# Patient Record
Sex: FEMALE | Race: WHITE | URBAN - METROPOLITAN AREA
[De-identification: names, ages, dates, MRNs, and addresses within clinical notes are randomized per-mention and may not be internally consistent; named-entity substitution may affect disease eponyms.]

---

## 2017-01-27 ENCOUNTER — OFFICE VISIT (OUTPATIENT)
Dept: FAMILY MEDICINE | Facility: CLINIC | Age: 22
End: 2017-01-27
Payer: COMMERCIAL

## 2017-01-27 VITALS
SYSTOLIC BLOOD PRESSURE: 114 MMHG | HEIGHT: 65 IN | DIASTOLIC BLOOD PRESSURE: 82 MMHG | WEIGHT: 144.19 LBS | TEMPERATURE: 97.7 F | HEART RATE: 94 BPM | OXYGEN SATURATION: 99 % | BODY MASS INDEX: 24.02 KG/M2

## 2017-01-27 DIAGNOSIS — K58.0 IRRITABLE BOWEL SYNDROME WITH DIARRHEA: Primary | ICD-10-CM

## 2017-01-27 DIAGNOSIS — N92.6 IRREGULAR PERIODS: ICD-10-CM

## 2017-01-27 DIAGNOSIS — L01.00 IMPETIGO: ICD-10-CM

## 2017-01-27 PROCEDURE — 99214 OFFICE O/P EST MOD 30 MIN: CPT | Performed by: FAMILY MEDICINE

## 2017-01-27 RX ORDER — MUPIROCIN 20 MG/G
OINTMENT TOPICAL 3 TIMES DAILY
Qty: 22 G | Refills: 1 | Status: SHIPPED | OUTPATIENT
Start: 2017-01-27 | End: 2017-02-01

## 2017-01-27 RX ORDER — LACTOBACILLUS ACIDOPHILUS 20B CELL
CAPSULE ORAL
Qty: 60 CAPSULE | Refills: 3 | Status: SHIPPED | OUTPATIENT
Start: 2017-01-27

## 2017-01-27 NOTE — PROGRESS NOTES
SUBJECTIVE:                                                    Dayana Whitman is a 22 year old female who presents to clinic today for the following health issues:      Gastrointestinal symptoms      Duration:  Onset in November followed up by a GI Provider    Description:           Bloating and sever gas in abdominal region    Intensity:  severe    Accompanying signs and symptoms:  Diarrhea, cramping and gas    History  Previous {similar problem: YES  Previous evaluation:  Blood tests and GI consultation    Aggravating factors: Gluten and dairy    Alleviating factors: lifestyle change including clean foods    Other Therapies tried:  as mentioned   All.LILLI Hopper  Provider note:    Here for follow-up. Still having gas bloating and painful loose stools. She does feel that symptoms are better but not completely gone.  She has seen [facility] and does have a follow-up visit but given that she still having symptoms wanted to see if there is anything else we can try until that visit comes up.    tried gluten and dairy free foods this helps some but still has some symptoms.  Saw Gi late November  They advised same   Labs for thyroid were checked  Has another Fu this feburary  Was told about IBS given info on low FODMAP diet  Tried this and no real change  Bloating and gas noted all the time  Has diarrhea - possibly once per week - is painful when she gets this  Eating very clean vegetables, etc even eating extra ball seems to make her symptoms worse.  Taking has tried probiotics culture L but has not tried lactobacillus acidophilus  Has tried imodium for 1 month - noted slower stools and felt much  more constipated did not like this so she stopped it. Did not try intermittent use  Has not tried fiber    Has lost about 10 pounds but thinks this is because of a restricted diet her energy is great and feels well otherwise. No blood in her stools.    #2 also has a concern for skin dryness. Her right ring finger has a dry  irritated patch that's got some crusting. She now has a little bit on her left ring finger as well. She denies any new lotions or soaps and denies any new habits repetitive use of her hands.    #3 also has noted one episode of early menstrual flow. Just in the last month she had some vaginal bleeding preceding her period by one week. She has had this in the past on one occasion and it was due to birth control. She has not missed any pills and has been on a moderate dose monophasic pill for the last few years. This has worked well for her. She does not have any pelvic pain she has no new sexual partners    Problem list and histories reviewed & adjusted, as indicated.  Additional history: as documented    Patient Active Problem List   Diagnosis     Superficial injury of cornea( resolving) OD     Isolated or specific phobia     Ankle fracture     Diagnostic skin and sensitization tests(aka ALLERGENS)     Allergy to mold spores     Allergic rhinitis due to animal dander     Seasonal allergic conjunctivitis     Seasonal allergic rhinitis     Acne     Need for desensitization to allergens     History reviewed. No pertinent past surgical history.    Social History   Substance Use Topics     Smoking status: Never Smoker      Smokeless tobacco: Never Used     Alcohol Use: No     Family History   Problem Relation Age of Onset     Lipids Mother      Neurologic Disorder Mother      Psychotic Disorder Mother      Arthritis Maternal Grandfather      Prostate Cancer Maternal Grandfather      Breast Cancer Paternal Grandmother      CANCER Paternal Grandmother      HEART DISEASE Paternal Grandfather      Hypertension Maternal Grandmother      Lipids Maternal Grandmother          Problem list, Medication list, Allergies, and Medical/Social/Surgical histories reviewed in EPIC and updated as appropriate.    ROS:  Constitutional, HEENT, cardiovascular, pulmonary, gi and gu systems are negative, except as otherwise noted.    OBJECTIVE:    "                                                 /82 mmHg  Pulse 94  Temp(Src) 97.7  F (36.5  C) (Tympanic)  Ht 5' 5.25\" (1.657 m)  Wt 144 lb 3 oz (65.403 kg)  BMI 23.82 kg/m2  SpO2 99%  LMP 01/20/2017  Breastfeeding? No  Body mass index is 23.82 kg/(m^2).  GENERAL APPEARANCE: healthy, alert and no distress  HENT: Moist mucous membranes  ABDOMEN: soft, nontender, without hepatosplenomegaly or masses and bowel sounds normal  SKIN: Small area of dry skin along both fingers of her right left hand. There is little bit of honey crusting indicating likely bacterial secondary infection.       ASSESSMENT/PLAN:                                                    1. Irritable bowel syndrome with diarrhea  It does seem that she has irritable bowel. Will try fiber with lots of water and slowly increase to see if this helps from stools. Discussed trying specifically lactobacillus acidophilus and reviewed where to get this if it's not available at her pharmacy she does have follow-up in February with GI and I think this is great  Certainly if her symptoms are persistent then colonoscopy and workup for inflammatory bowel is the next step    - psyllium 0.52 G capsule; Take 1 capsule (0.52 g) by mouth daily After 1-2 weeks increase to 2 per day with water  Dispense: 90 capsule; Refill: 1  - Lactobacillus (FLORAJEN ACIDOPHILUS) CAPS; 1 capsule twice daily  Dispense: 60 capsule; Refill: 3    2. Impetigo  Treating as noted below discussed ways to avoid dry skin  - mupirocin (BACTROBAN) 2 % ointment; Apply topically 3 times daily for 5 days  Dispense: 22 g; Refill: 1    3. Irregular periods  Did not focus on this today due to other concerns that she had.    I reviewed with her that if she has ongoing irregular periods over the next 2 weeks would recommend pelvic ultrasound and pelvic exam.  She's had one episode of early onset of menses and this may have been a cyst or something that had resolved. Would recommend rechecking " or following her symptoms over the next 2 months. If she has pain or ongoing bleeding needs to be evaluated  - US Pelvic Complete with Transvaginal; Future      Follow up with Provider - as noted above with GI and certainly if she has any pelvic pain or worsening irregular periods should be seen for pelvic exam and ultrasound     Carol Wilson MD  [facility]    HPI      ROS      Physical Exam

## 2017-01-27 NOTE — MR AVS SNAPSHOT
After Visit Summary   1/27/2017    Dayana Whitman    MRN: [id number]           Patient Information     Date Of Birth          1995        Visit Information        Provider Department      1/27/2017 3:00 PM Carol Wilson MD [facility]        Today's Diagnoses     Irritable bowel syndrome with diarrhea    -  1     Impetigo         Irregular periods            Follow-ups after your visit        Future tests that were ordered for you today     Open Future Orders        Priority Expected Expires Ordered    US Pelvic Complete with Transvaginal Routine  1/27/2018 1/27/2017            Who to contact     If you have questions or need follow up information about today's clinic visit or your schedule please contact [facility] directly at [phone].  Normal or non-critical lab and imaging results will be communicated to you by Leondra musichart, letter or phone within 4 business days after the clinic has received the results. If you do not hear from us within 7 days, please contact the clinic through MyChart or phone. If you have a critical or abnormal lab result, we will notify you by phone as soon as possible.  Submit refill requests through Venda or call your pharmacy and they will forward the refill request to us. Please allow 3 business days for your refill to be completed.          Additional Information About Your Visit        MyChart Information     Venda gives you secure access to your electronic health record. If you see a primary care provider, you can also send messages to your care team and make appointments. If you have questions, please call your primary care clinic.  If you do not have a primary care provider, please call [phone] and they will assist you.        Care EveryWhere ID     This is your Care EveryWhere ID. This could be used by other organizations to access your [place] medical records  [id number]        Your Vitals Were     Pulse Temperature  "Height    94 97.7  F (36.5  C) (Tympanic) 5' 5.25\" (1.657 m)    BMI (Body Mass Index) Pulse Oximetry Last Period    23.82 kg/m2 99% 01/20/2017    Breastfeeding?          No         Blood Pressure from Last 3 Encounters:   01/27/17 114/82   12/02/16 108/62   11/11/16 110/67    Weight from Last 3 Encounters:   01/27/17 144 lb 3 oz (65.403 kg) (73.90 %*)   12/02/16 143 lb 9.6 oz (65.137 kg) (73.50 %*)   11/11/16 143 lb 11.2 oz (65.182 kg) (73.75 %*)     * Growth percentiles are based on [facility] 2-20 Years data.                 Today's Medication Changes          These changes are accurate as of: 1/27/17  3:33 PM.  If you have any questions, ask your nurse or doctor.               Start taking these medicines.        Dose/Directions    FLORAJEN ACIDOPHILUS Caps   Used for:  Irritable bowel syndrome with diarrhea   Started by:  Carol Wilson MD        1 capsule twice daily   Quantity:  60 capsule   Refills:  3       mupirocin 2 % ointment   Commonly known as:  BACTROBAN   Used for:  Impetigo   Started by:  Carol Wilson MD        Apply topically 3 times daily for 5 days   Quantity:  22 g   Refills:  1       psyllium 0.52 G capsule   Used for:  Irritable bowel syndrome with diarrhea   Started by:  Carol Wilson MD        Dose:  1 capsule   Take 1 capsule (0.52 g) by mouth daily After 1-2 weeks increase to 2 per day with water   Quantity:  90 capsule   Refills:  1            Where to get your medicines      These medications were sent to [id number] - [place], [place] - [address]  [address], [facility] [address]     Phone:  [phone]    - FLORAJEN ACIDOPHILUS Caps  - mupirocin 2 % ointment  - psyllium 0.52 G capsule             Primary Care Provider Office Phone # Fax #    Myrna Pineda [phone]       [facility] [address] [address] [address]        Thank you!     Thank you for choosing [facility]  for your care. Our goal is always to provide you with excellent care. Hearing back from our patients is one way we can continue to improve our services. Please take a few minutes to complete the written survey that you may receive in the mail after your visit with us. Thank you!             Your Updated Medication List - Protect others around you: Learn how to safely use, store and throw away your medicines at www.disposemymeds.org.          This list is accurate as of: 1/27/17  3:33 PM.  Always use your most recent med list.                   Brand Name Dispense Instructions for use    adapalene 0.1 % gel    DIFFERIN    45 g    Apply topically At Bedtime       ALLERGEN IMMUNOTHERAPY PRESCRIPTION          CULTURELLE DIGESTIVE HEALTH Caps     60 capsule    Try 1 capsule twice daily for 2-4 weeks       drospirenone-ethinyl estradiol 3-0.02 MG per tablet    KEVIN    90 tablet    Take 1 tablet by mouth daily       FLORAJEN ACIDOPHILUS Caps     60 capsule    1 capsule twice daily       mupirocin 2 % ointment    BACTROBAN    22 g    Apply topically 3 times daily for 5 days       psyllium 0.52 G capsule     90 capsule    Take 1 capsule (0.52 g) by mouth daily After 1-2 weeks increase to 2 per day with water

## 2017-01-27 NOTE — NURSING NOTE
"Chief Complaint   Patient presents with     Gastrointestinal Problem     Diarrhea, cramping and gas     /82 mmHg  Pulse 94  Temp(Src) 97.7  F (36.5  C) (Tympanic)  Ht 5' 5.25\" (1.657 m)  Wt 144 lb 3 oz (65.403 kg)  BMI 23.82 kg/m2  SpO2 99%  LMP 01/20/2017  Breastfeeding? No Estimated body mass index is 23.82 kg/(m^2) as calculated from the following:    Height as of this encounter: 5' 5.25\" (1.657 m).    Weight as of this encounter: 144 lb 3 oz (65.403 kg).  bp completed using cuff size: regular      Health Maintenance addressed:  NONE    n/a                "

## 2017-04-10 DIAGNOSIS — L70.0 ACNE VULGARIS: ICD-10-CM

## 2017-04-10 NOTE — TELEPHONE ENCOUNTER
Received a refill request from patient's pharmacy for Raquel.  Patient was last seen in June 2016.  At that time, patient was recommended to follow up in 10 weeks.  No follow up has been completed and no upcoming appointment scheduled.  Order pended to Dr. Farfan to review.

## 2017-04-11 RX ORDER — DROSPIRENONE AND ETHINYL ESTRADIOL 0.02-3(28)
1 KIT ORAL DAILY
Qty: 90 TABLET | Refills: 0 | Status: SHIPPED | OUTPATIENT
Start: 2017-04-11 | End: 2017-06-13

## 2017-06-13 ENCOUNTER — OFFICE VISIT (OUTPATIENT)
Dept: DERMATOLOGY | Facility: CLINIC | Age: 22
End: 2017-06-13
Attending: DERMATOLOGY
Payer: COMMERCIAL

## 2017-06-13 DIAGNOSIS — L30.9 DERMATITIS: Primary | ICD-10-CM

## 2017-06-13 DIAGNOSIS — L70.0 ACNE VULGARIS: ICD-10-CM

## 2017-06-13 PROCEDURE — 99212 OFFICE O/P EST SF 10 MIN: CPT | Mod: ZF

## 2017-06-13 RX ORDER — ADAPALENE 45 G/G
GEL TOPICAL AT BEDTIME
Qty: 45 G | Refills: 3 | Status: SHIPPED | OUTPATIENT
Start: 2017-06-13

## 2017-06-13 RX ORDER — DESONIDE 0.5 MG/G
OINTMENT TOPICAL
Qty: 15 G | Refills: 0 | Status: SHIPPED | OUTPATIENT
Start: 2017-06-13 | End: 2018-03-07

## 2017-06-13 RX ORDER — DROSPIRENONE AND ETHINYL ESTRADIOL 0.02-3(28)
1 KIT ORAL DAILY
Qty: 90 TABLET | Refills: 3 | Status: SHIPPED | OUTPATIENT
Start: 2017-06-13 | End: 2018-03-07

## 2017-06-13 NOTE — MR AVS SNAPSHOT
After Visit Summary   6/13/2017    Dayana Whitman    MRN: [id number]           Patient Information     Date Of Birth          1995        Visit Information        Provider Department      6/13/2017 3:00 PM Adriana Farfan MD Peds Dermatology        Today's Diagnoses     Dermatitis    -  1    Acne vulgaris          Care Instructions    [facility]- Pediatric Dermatology  Dr. Adriana Farfan, Dr. Brianna Byers, Dr. Sandra Avila, Dr. Rosa Danielson, Dr. Albert Crouch       Pediatric Appointment Scheduling and Call Center [phone]     Non Urgent -Triage Voicemail Line; [phone]- Sobeida and Cecilia RN's. Messages are checked periodically throughout the day and are returned as soon as possible.      Clinic Fax number: [phone]    If you need a prescription refill, please contact your pharmacy. They will send us an electronic request. Refills are approved or denied by our Physicians during normal business hours, Monday through Fridays    Per office policy, refills will not be granted if you have not been seen within the past year (or sooner depending on your child's condition)    *Radiology Scheduling- [phone]  *Sedation Unit Scheduling- [phone]  *[place] Scheduling- General [phone]; Pediatric Dermatology [phone]  *Main  Services: [phone]   Upper sorbian: [phone]   Sri Lankan: [phone]   Hmong/German/Persian: [phone]    For urgent matters that cannot wait until the next business day, is over a holiday and/or a weekend please call [phone] and ask for the Dermatology Resident On-Call to be paged.               Continue Raquel, continue follow up with OBGYN for PAP smears    You may need to purchase diferin over the counter    Continue benzoyl peroxide wash    Start desonide ointment for the rash, if not gone in 2 weeks we will send a safer medication to use around the eyes          Follow-ups after your  visit        Follow-up notes from your care team     Return in about 1 year (around 6/13/2018).      Who to contact     Please call your clinic at [phone] to:    Ask questions about your health    Make or cancel appointments    Discuss your medicines    Learn about your test results    Speak to your doctor   If you have compliments or concerns about an experience at your clinic, or if you wish to file a complaint, please contact [facility] Patient Relations at [phone] or email us at [email or web address]         Additional Information About Your Visit        Nextnavhart Information     Suniva gives you secure access to your electronic health record. If you see a primary care provider, you can also send messages to your care team and make appointments. If you have questions, please call your primary care clinic.  If you do not have a primary care provider, please call [phone] and they will assist you.      Suniva is an electronic gateway that provides easy, online access to your medical records. With Suniva, you can request a clinic appointment, read your test results, renew a prescription or communicate with your care team.     To access your existing account, please contact your [facility] or call [phone] for assistance.        Care EveryWhere ID     This is your Care EveryWhere ID. This could be used by other organizations to access your [place] medical records  [id number]         Blood Pressure from Last 3 Encounters:   01/27/17 114/82   12/02/16 108/62   11/11/16 110/67    Weight from Last 3 Encounters:   01/27/17 144 lb 3 oz (65.4 kg) (74 %)*   12/02/16 143 lb 9.6 oz (65.1 kg) (74 %)*   11/11/16 143 lb 11.2 oz (65.2 kg) (74 %)*     * Growth percentiles are based on CDC 2-20 Years data.              Today, you had the following     No orders found for display         Today's Medication Changes          These changes are  accurate as of: 6/13/17  3:55 PM.  If you have any questions, ask your nurse or doctor.               Start taking these medicines.        Dose/Directions    desonide 0.05 % ointment   Commonly known as:  DESOWEN   Used for:  Dermatitis   Started by:  Adriana Farfan MD        Use on the neck and eyelid for rash twice a day until clear, call if you need to use more than 2 weeks   Quantity:  15 g   Refills:  0            Where to get your medicines      These medications were sent to [facility] [address]  [address], [facility] [address]     Phone:  [phone]     adapalene 0.1 % gel    desonide 0.05 % ointment    drospirenone-ethinyl estradiol 3-0.02 MG per tablet                Primary Care Provider Office Phone # Fax #    Myrna Pineda [phone]       [address] [address]        Thank you!     Thank you for choosing [facility]  for your care. Our goal is always to provide you with excellent care. Hearing back from our patients is one way we can continue to improve our services. Please take a few minutes to complete the written survey that you may receive in the mail after your visit with us. Thank you!             Your Updated Medication List - Protect others around you: Learn how to safely use, store and throw away your medicines at www.disposemymeds.org.          This list is accurate as of: 6/13/17  3:55 PM.  Always use your most recent med list.                   Brand Name Dispense Instructions for use    adapalene 0.1 % gel    DIFFERIN    45 g    Apply topically At Bedtime       ALLERGEN IMMUNOTHERAPY PRESCRIPTION          [facility] DIGESTIVE HEALTH Caps     60 capsule    Try 1 capsule twice daily for 2-4 weeks       desonide 0.05 % ointment    DESOWEN    15 g    Use on the neck and eyelid for rash twice a day until clear, call if you need to use more than 2 weeks        drospirenone-ethinyl estradiol 3-0.02 MG per tablet    KEVIN    90 tablet    Take 1 tablet by mouth daily       FLORAJEN ACIDOPHILUS Caps     60 capsule    1 capsule twice daily       psyllium 0.52 G capsule     90 capsule    Take 1 capsule (0.52 g) by mouth daily After 1-2 weeks increase to 2 per day with water

## 2017-06-13 NOTE — PROGRESS NOTES
[facility]   Pediatric Dermatology Follow up Patient Visit        CHIEF COMPLAINT: f/u acne    DERMATOLOGY PROBLEM LIST:   1. Acne  - previous: ortho tri-cyclen  Currently  - KEVIN  - adapalene  - BPO    HISTORY OF PRESENT ILLNESS: This is a 22 year old female who presents by herself.  Last visit 6/7/16.  At that time her OCP was switched from ortho tri-cyclen to KEVIN for her acne, occasionally using adapalene 0.1% gel and BPO wash. She states that she is very pleased KEVIN, will have flares with period, which is when she uses the BPO/adapalene. She states that her acne today is the best it has ever been.     She also presents with a rash on her left neck x 3 months. This is slightly pruritic, no treatments.     PAST MEDICAL HISTORY:   Past Medical History:   Diagnosis Date     Allergic rhinitis due to animal dander      Allergy to mold spores     10/12 skin tests per Dr. Fernando pos. for: cat/dog/M/G/RW     Diagnostic skin and sensitization tests 10/12 skin tests per Dr. Fernando pos. for: cat/dog/M/G/RW     Need for desensitization to allergens 10/14 IT start for cat/dog/M/G/RW     Seasonal allergic conjunctivitis      Seasonal allergic rhinitis         FAMILY HISTORY:   Family History   Problem Relation Age of Onset     Lipids Mother      Neurologic Disorder Mother      Psychotic Disorder Mother      Arthritis Maternal Grandfather      Prostate Cancer Maternal Grandfather      Breast Cancer Paternal Grandmother      CANCER Paternal Grandmother      HEART DISEASE Paternal Grandfather      Hypertension Maternal Grandmother      Lipids Maternal Grandmother        SOCIAL HISTORY: student at the , starting summer classes. Recently engaged to be .    REVIEW OF SYSTEMS: A 10-point review of systems was noncontributory.  Dayana Whitman  denies fevers, chills, weight loss, fatigue, chest pain, shortness of breath, abdominal symptoms, nausea, vomiting, diarrhea, constipation,  genitourinary, or musculoskeletal complaints.     MEDICATIONS:   Current Outpatient Prescriptions   Medication     drospirenone-ethinyl estradiol (KEVIN) 3-0.02 MG per tablet     psyllium 0.52 G capsule     Lactobacillus (FLORAJEN ACIDOPHILUS) CAPS     Lactobacillus-Inulin ([facility] DIGESTIVE HEALTH) CAPS     adapalene (DIFFERIN) 0.1 % gel     ALLERGY SHOTS     No current facility-administered medications for this visit.         ALLERGIES:NKDA    PHYSICAL EXAMINATION:  VITALS: There were no vitals taken for this visit.    GENERAL:Well-appearing, well-nourished in no acute distress.  HEAD: Normocephalic, non-dysmorphic.   EYES: Clear. Conjunctiva normal.  NECK: Supple.  RESPIRATORY: Patient is breathing comfortably in room air.   CARDIOVASCULAR: Well perfused in all extremities. No peripheral edema.   ABDOMEN: Nondistended.   EXTREMITIES: No clubbing or cyanosis. Nails normal.  SKIN: Full-body skin exam including inspection and palpation of the skin and subcutaneous tissues of the face, neck, chest, abdomen, back, bilateral upper extremities, was completed today. Exam notable for:   - Raul II  - oval plaque, medium pink on the left neck with fine powdery scale, also similar papules on the lower left eyelid and the anteiror neck  - superficial rolled scars on b/l cheeks  - 2 inflammatory papules on the right cheek    INOFFICE LABS: KOH: NEGATIVE  IMPRESSION AND PLAN:  1. Acne, predominantly hormonal and under good control with oral hormonal contraception. Recommend continued f/u with OBGYN for PAP smears. Ok to stay on unless she is having side effects.   - Refilled Kevin  - refilled adapalene 0.1% gel, may need to buy OTC  - continue OTC BPO wash    2. Dermatitis, with negative KOH, recommend antiinflammatory, desonide 0.05% ointment twice a day x 2 weeks. If she notes that she is using this more of the days of the month than not, she should call us so that we can send Protopic.     FOLLOW UP: 1 year  Staffed this  patient with Dr. Farfan.     Natacha Bergman MD  Dermatology Resident, PGY4    Thank you for involving us in the care of your patient.    Sincerely,     Patient was seen and examined with the dermatology resident. I agree with the history, review of systems, physical examination, assessments and plan. I personally obtained, prepared and examined the KOH prep. I interpreted the results to be negative for hypae.    Adriana Farfan MD   , Departments of Dermatology & Pediatrics   Director, Pediatric Dermatology  [facility], [facility]  [phone]

## 2017-06-13 NOTE — LETTER
6/13/2017      RE: Dayana Whitman  [address]   [facility] [address]       [facility]   Pediatric Dermatology Follow up Patient Visit        CHIEF COMPLAINT: f/u acne    DERMATOLOGY PROBLEM LIST:   1. Acne  - previous: ortho tri-cyclen  Currently  - KEVIN  - adapalene  - BPO    HISTORY OF PRESENT ILLNESS: This is a 22 year old female who presents by herself.  Last visit 6/7/16.  At that time her OCP was switched from ortho tri-cyclen to KEVIN for her acne, occasionally using adapalene 0.1% gel and BPO wash. She states that she is very pleased KEVIN, will have flares with period, which is when she uses the BPO/adapalene. She states that her acne today is the best it has ever been.     She also presents with a rash on her left neck x 3 months. This is slightly pruritic, no treatments.     PAST MEDICAL HISTORY:   Past Medical History:   Diagnosis Date     Allergic rhinitis due to animal dander      Allergy to mold spores     10/12 skin tests per Dr. Fernando pos. for: cat/dog/M/G/RW     Diagnostic skin and sensitization tests 10/12 skin tests per Dr. Fernando pos. for: cat/dog/M/G/RW     Need for desensitization to allergens 10/14 IT start for cat/dog/M/G/RW     Seasonal allergic conjunctivitis      Seasonal allergic rhinitis         FAMILY HISTORY:   Family History   Problem Relation Age of Onset     Lipids Mother      Neurologic Disorder Mother      Psychotic Disorder Mother      Arthritis Maternal Grandfather      Prostate Cancer Maternal Grandfather      Breast Cancer Paternal Grandmother      CANCER Paternal Grandmother      HEART DISEASE Paternal Grandfather      Hypertension Maternal Grandmother      Lipids Maternal Grandmother        SOCIAL HISTORY: student at the [employer], starting summer classes. Recently engaged to be .    REVIEW OF SYSTEMS: A 10-point review of systems was noncontributory.  Dayana Whitman  denies fevers, chills, weight loss, fatigue, chest  pain, shortness of breath, abdominal symptoms, nausea, vomiting, diarrhea, constipation, genitourinary, or musculoskeletal complaints.     MEDICATIONS:   Current Outpatient Prescriptions   Medication     drospirenone-ethinyl estradiol (KEVIN) 3-0.02 MG per tablet     psyllium 0.52 G capsule     Lactobacillus (FLORAJEN ACIDOPHILUS) CAPS     Lactobacillus-Inulin ([facility] DIGESTIVE HEALTH) CAPS     adapalene (DIFFERIN) 0.1 % gel     ALLERGY SHOTS     No current facility-administered medications for this visit.         ALLERGIES:NKDA    PHYSICAL EXAMINATION:  VITALS: There were no vitals taken for this visit.    GENERAL:Well-appearing, well-nourished in no acute distress.  HEAD: Normocephalic, non-dysmorphic.   EYES: Clear. Conjunctiva normal.  NECK: Supple.  RESPIRATORY: Patient is breathing comfortably in room air.   CARDIOVASCULAR: Well perfused in all extremities. No peripheral edema.   ABDOMEN: Nondistended.   EXTREMITIES: No clubbing or cyanosis. Nails normal.  SKIN: Full-body skin exam including inspection and palpation of the skin and subcutaneous tissues of the face, neck, chest, abdomen, back, bilateral upper extremities, was completed today. Exam notable for:   - Raul II  - oval plaque, medium pink on the left neck with fine powdery scale, also similar papules on the lower left eyelid and the anteiror neck  - superficial rolled scars on b/l cheeks  - 2 inflammatory papules on the right cheek    INOFFICE LABS: KOH: NEGATIVE  IMPRESSION AND PLAN:  1. Acne, predominantly hormonal and under good control with oral hormonal contraception. Recommend continued f/u with OBGYN for PAP smears. Ok to stay on unless she is having side effects.   - Refilled Kevin  - refilled adapalene 0.1% gel, may need to buy OTC  - continue OTC BPO wash    2. Dermatitis, with negative KOH, recommend antiinflammatory, desonide 0.05% ointment twice a day x 2 weeks. If she notes that she is using this more of the days of the month than not,  she should call us so that we can send Protopic.     FOLLOW UP: 1 year  Staffed this patient with Dr. Farfan.     Natacha Bergman MD  Dermatology Resident, PGY4    Thank you for involving us in the care of your patient.    Sincerely,     Patient was seen and examined with the dermatology resident. I agree with the history, review of systems, physical examination, assessments and plan. I personally obtained, prepared and examined the KOH prep. I interpreted the results to be negative for hypae.    Adriana Farfan MD   , Departments of Dermatology & Pediatrics   Director, Pediatric Dermatology  [facility], [facility]  [phone]

## 2017-06-13 NOTE — PATIENT INSTRUCTIONS
[facility]- Pediatric Dermatology  Dr. Adriana Farfan, Dr. Brianna Byers, Dr. Sandra Avila, Dr. Rosa Danielson, Dr. Albert Crouch       Pediatric Appointment Scheduling and Call Center [phone]     Non Urgent -Triage Voicemail Line; [phone]- Sobeida and Cecilia RN's. Messages are checked periodically throughout the day and are returned as soon as possible.      Clinic Fax number: [phone]    If you need a prescription refill, please contact your pharmacy. They will send us an electronic request. Refills are approved or denied by our Physicians during normal business hours, Monday through Fridays    Per office policy, refills will not be granted if you have not been seen within the past year (or sooner depending on your child's condition)    *Radiology Scheduling- [phone]  *Sedation Unit Scheduling- [phone]  *[place] Scheduling- General [phone]; Pediatric Dermatology [phone]  *Main  Services: [phone]   Ukrainian: [phone]   Mauritanian: [phone]   Hmong/Kittitian/Torito: [phone]    For urgent matters that cannot wait until the next business day, is over a holiday and/or a weekend please call [phone] and ask for the Dermatology Resident On-Call to be paged.               Continue Raquel, continue follow up with OBGYN for PAP smears    You may need to purchase diferin over the counter    Continue benzoyl peroxide wash    Start desonide ointment for the rash, if not gone in 2 weeks we will send a safer medication to use around the eyes

## 2017-06-13 NOTE — NURSING NOTE
Chief Complaint   Patient presents with     Follow Up For     Needs refill for birth control and rash on left side of neck     Jennifer Ortega LPN

## 2017-07-10 ENCOUNTER — OFFICE VISIT (OUTPATIENT)
Dept: FAMILY MEDICINE | Facility: CLINIC | Age: 22
End: 2017-07-10
Payer: COMMERCIAL

## 2017-07-10 VITALS
RESPIRATION RATE: 14 BRPM | TEMPERATURE: 98.2 F | WEIGHT: 140 LBS | DIASTOLIC BLOOD PRESSURE: 60 MMHG | HEIGHT: 65 IN | BODY MASS INDEX: 23.32 KG/M2 | OXYGEN SATURATION: 99 % | SYSTOLIC BLOOD PRESSURE: 124 MMHG | HEART RATE: 82 BPM

## 2017-07-10 DIAGNOSIS — Z30.09 ENCOUNTER FOR OTHER GENERAL COUNSELING OR ADVICE ON CONTRACEPTION: Primary | ICD-10-CM

## 2017-07-10 PROCEDURE — 99213 OFFICE O/P EST LOW 20 MIN: CPT | Performed by: FAMILY MEDICINE

## 2017-07-10 NOTE — PROGRESS NOTES
SUBJECTIVE:                                                    Dayana Whitman is a 22 year old female who presents to clinic today for the following health issues:      Chief Complaint   Patient presents with     Contraception     discuss options   interested in talking about options for birth control  Would like longer term control  Reviewed options - IUD depo, nexplanon          Problem list and histories reviewed & adjusted, as indicated.  Additional history: as documented    Patient Active Problem List   Diagnosis     Superficial injury of cornea( resolving) OD     Isolated or specific phobia     Ankle fracture     Diagnostic skin and sensitization tests(aka ALLERGENS)     Allergy to mold spores     Allergic rhinitis due to animal dander     Seasonal allergic conjunctivitis     Seasonal allergic rhinitis     Acne     Need for desensitization to allergens     History reviewed. No pertinent surgical history.    Social History   Substance Use Topics     Smoking status: Never Smoker     Smokeless tobacco: Never Used     Alcohol use No     Family History   Problem Relation Age of Onset     Lipids Mother      Neurologic Disorder Mother      Psychotic Disorder Mother      Arthritis Maternal Grandfather      Prostate Cancer Maternal Grandfather      Breast Cancer Paternal Grandmother      CANCER Paternal Grandmother      HEART DISEASE Paternal Grandfather      Hypertension Maternal Grandmother      Lipids Maternal Grandmother          Current Outpatient Prescriptions   Medication Sig Dispense Refill     drospirenone-ethinyl estradiol (KEVIN) 3-0.02 MG per tablet Take 1 tablet by mouth daily 90 tablet 3     desonide (DESOWEN) 0.05 % ointment Use on the neck and eyelid for rash twice a day until clear, call if you need to use more than 2 weeks 15 g 0     adapalene (DIFFERIN) 0.1 % gel Apply topically At Bedtime (Patient not taking: Reported on 7/10/2017) 45 g 3     psyllium 0.52 G capsule Take 1 capsule (0.52 g) by mouth  "daily After 1-2 weeks increase to 2 per day with water (Patient not taking: Reported on 7/10/2017) 90 capsule 1     Lactobacillus (FLORAJEN ACIDOPHILUS) CAPS 1 capsule twice daily (Patient not taking: Reported on 7/10/2017) 60 capsule 3     Lactobacillus-Inulin ([facility] DIGESTIVE [facility]) CAPS Try 1 capsule twice daily for 2-4 weeks (Patient not taking: Reported on 7/10/2017) 60 capsule 0     ALLERGY SHOTS          Reviewed and updated as needed this visit by clinical staff  Tobacco  Allergies  Meds  Med Hx  Surg Hx  Fam Hx  Soc Hx      Reviewed and updated as needed this visit by Provider         ROS:  Constitutional, HEENT, cardiovascular, pulmonary, gi and gu systems are negative, except as otherwise noted.    OBJECTIVE:                                                    /60  Pulse 82  Temp 98.2  F (36.8  C) (Oral)  Resp 14  Ht 5' 5.25\" (1.657 m)  Wt 140 lb (63.5 kg)  SpO2 99%  Breastfeeding? No  BMI 23.12 kg/m2  Body mass index is 23.12 kg/(m^2).  GENERAL APPEARANCE: healthy, alert and no distress         ASSESSMENT/PLAN:                                                    Birth control options reviewed  She is interested most in nexplanon - reviewed name of providers who can place this  Encouraged to call with questions    Follow up with Provider - as needed     Carol Wilson MD  [facility]      "

## 2017-07-10 NOTE — NURSING NOTE
"Chief Complaint   Patient presents with     Contraception     discuss options       Initial /60  Pulse 82  Temp 98.2  F (36.8  C) (Oral)  Resp 14  Ht 5' 5.25\" (1.657 m)  Wt 140 lb (63.5 kg)  SpO2 99%  Breastfeeding? No  BMI 23.12 kg/m2 Estimated body mass index is 23.12 kg/(m^2) as calculated from the following:    Height as of this encounter: 5' 5.25\" (1.657 m).    Weight as of this encounter: 140 lb (63.5 kg).  BP completed using cuff size: regular    Health Maintenance that is potentially due pending provider review:  Health Maintenance Due   Topic Date Due     HPV IMMUNIZATION (1 of 3 - Female 3 Dose Series) 01/27/2008         Per provider and pt  "

## 2017-07-10 NOTE — MR AVS SNAPSHOT
"              After Visit Summary   7/10/2017    Dayana Whitman    MRN: [id number]           Patient Information     Date Of Birth          1995        Visit Information        Provider Department      7/10/2017 1:45 PM Carol Wilson MD [facility]        Care Instructions    Nexplanon insertion: Dr. Cid or Certified Midwives on Mondays          Follow-ups after your visit        Who to contact     If you have questions or need follow up information about today's clinic visit or your schedule please contact [facility] directly at [phone].  Normal or non-critical lab and imaging results will be communicated to you by MyChart, letter or phone within 4 business days after the clinic has received the results. If you do not hear from us within 7 days, please contact the clinic through Accertifyhart or phone. If you have a critical or abnormal lab result, we will notify you by phone as soon as possible.  Submit refill requests through [employer] or call your pharmacy and they will forward the refill request to us. Please allow 3 business days for your refill to be completed.          Additional Information About Your Visit        MyChart Information     Pomelo gives you secure access to your electronic health record. If you see a primary care provider, you can also send messages to your care team and make appointments. If you have questions, please call your primary care clinic.  If you do not have a primary care provider, please call [phone] and they will assist you.        Care EveryWhere ID     This is your Care EveryWhere ID. This could be used by other organizations to access your [facility] medical records  [id number]        Your Vitals Were     Pulse Temperature Respirations Height Pulse Oximetry Breastfeeding?    82 98.2  F (36.8  C) (Oral) 14 5' 5.25\" (1.657 m) 99% No    BMI (Body Mass Index)                   23.12 kg/m2            Blood Pressure from Last 3 " Encounters:   07/10/17 124/60   01/27/17 114/82   12/02/16 108/62    Weight from Last 3 Encounters:   07/10/17 140 lb (63.5 kg)   01/27/17 144 lb 3 oz (65.4 kg) (74 %)*   12/02/16 143 lb 9.6 oz (65.1 kg) (74 %)*     * Growth percentiles are based on [facility] 2-20 Years data.              Today, you had the following     No orders found for display       Primary Care Provider Office Phone # Fax #    Myrna Pineda [phone]       [facility] [address] [facility] [address]        Equal Access to Services     LOUIS YODER : Hadii aad ku hadasho Soomaali, waaxda luqadaha, qaybta kaalmada adeegyada, waxay idiin hayyemin romain cisneros . So [facility] [phone].    ATENCIÓN: Si habla español, tiene a elias disposición servicios gratuitos de asistencia lingüística. Llame al [phone].    We comply with applicable federal civil rights laws and [place] laws. We do not discriminate on the basis of race, color, national origin, age, disability sex, sexual orientation or gender identity.            Thank you!     Thank you for choosing [facility]  for your care. Our goal is always to provide you with excellent care. Hearing back from our patients is one way we can continue to improve our services. Please take a few minutes to complete the written survey that you may receive in the mail after your visit with us. Thank you!             Your Updated Medication List - Protect others around you: Learn how to safely use, store and throw away your medicines at www.disposemymeds.org.          This list is accurate as of: 7/10/17  2:03 PM.  Always use your most recent med list.                   Brand Name Dispense Instructions for use Diagnosis    adapalene 0.1 % gel    DIFFERIN    45 g    Apply topically At Bedtime    Acne vulgaris       ALLERGEN IMMUNOTHERAPY PRESCRIPTION           CULTUREE DIGESTIVE HEALTH Caps     60 capsule    Try 1 capsule twice daily for 2-4 weeks     Loose stools       desonide 0.05 % ointment    DESOWEN    15 g    Use on the neck and eyelid for rash twice a day until clear, call if you need to use more than 2 weeks    Dermatitis       drospirenone-ethinyl estradiol 3-0.02 MG per tablet    KEVIN    90 tablet    Take 1 tablet by mouth daily    Acne vulgaris       FLORAJEN ACIDOPHILUS Caps     60 capsule    1 capsule twice daily    Irritable bowel syndrome with diarrhea       psyllium 0.52 G capsule     90 capsule    Take 1 capsule (0.52 g) by mouth daily After 1-2 weeks increase to 2 per day with water    Irritable bowel syndrome with diarrhea

## 2018-03-07 ENCOUNTER — OFFICE VISIT (OUTPATIENT)
Dept: FAMILY MEDICINE | Facility: CLINIC | Age: 23
End: 2018-03-07
Payer: COMMERCIAL

## 2018-03-07 VITALS
OXYGEN SATURATION: 99 % | TEMPERATURE: 99.3 F | DIASTOLIC BLOOD PRESSURE: 58 MMHG | HEART RATE: 88 BPM | RESPIRATION RATE: 14 BRPM | HEIGHT: 65 IN | BODY MASS INDEX: 23.82 KG/M2 | SYSTOLIC BLOOD PRESSURE: 110 MMHG | WEIGHT: 143 LBS

## 2018-03-07 DIAGNOSIS — L70.0 ACNE VULGARIS: ICD-10-CM

## 2018-03-07 DIAGNOSIS — Z00.00 ROUTINE GENERAL MEDICAL EXAMINATION AT A HEALTH CARE FACILITY: Primary | ICD-10-CM

## 2018-03-07 PROCEDURE — G0145 SCR C/V CYTO,THINLAYER,RESCR: HCPCS | Performed by: FAMILY MEDICINE

## 2018-03-07 PROCEDURE — 87491 CHLMYD TRACH DNA AMP PROBE: CPT | Performed by: FAMILY MEDICINE

## 2018-03-07 PROCEDURE — 87591 N.GONORRHOEAE DNA AMP PROB: CPT | Performed by: FAMILY MEDICINE

## 2018-03-07 PROCEDURE — 99395 PREV VISIT EST AGE 18-39: CPT | Performed by: FAMILY MEDICINE

## 2018-03-07 RX ORDER — DROSPIRENONE AND ETHINYL ESTRADIOL 0.02-3(28)
1 KIT ORAL DAILY
Qty: 90 TABLET | Refills: 3 | Status: SHIPPED | OUTPATIENT
Start: 2018-03-07

## 2018-03-07 NOTE — NURSING NOTE
"Chief Complaint   Patient presents with     Physical     pap and chlamydia due-Pre-employment px-pt requests results of visit be released to [employer] if possible or faxed to mary beth-(Pt will call with fax number)       Initial /58  Pulse 88  Temp 99.3  F (37.4  C) (Tympanic)  Resp 14  Ht 5' 5.25\" (1.657 m)  Wt 143 lb (64.9 kg)  LMP 02/22/2018 (Approximate)  SpO2 99%  Breastfeeding? No  BMI 23.61 kg/m2 Estimated body mass index is 23.61 kg/(m^2) as calculated from the following:    Height as of this encounter: 5' 5.25\" (1.657 m).    Weight as of this encounter: 143 lb (64.9 kg).  BP completed using cuff size: regular    Health Maintenance that is potentially due pending provider review:  Pap Smear and Chlamydia screening    PAP--Possibly completing today, per Provider review and flu vaccine done 9-2017 and Tdap UTD  "

## 2018-03-07 NOTE — PROGRESS NOTES
SUBJECTIVE:   CC: Dayana Whitman is an 23 year old woman who presents for preventive health visit.     Physical   Annual:     Getting at least 3 servings of Calcium per day::  Yes    Bi-annual eye exam::  Yes    Dental care twice a year::  Yes    Sleep apnea or symptoms of sleep apnea::  None    Diet::  Gluten-free/reduced    Frequency of exercise::  2-3 days/week    Duration of exercise::  15-30 minutes    Taking medications regularly::  Yes    Medication side effects::  None    Additional concerns today::  No            (Z00.00) Routine general medical examination at a health care facility  (primary encounter diagnosis)  Comment:  Routine WWE  Plan: Pap imaged thin layer screen only - recommended        age 21 - 24 years, NEISSERIA GONORRHOEA PCR,         CHLAMYDIA TRACHOMATIS PCR, CANCELED: NEISSERIA         GONORRHOEA PCR, CANCELED: CHLAMYDIA TRACHOMATIS        PCR            (L70.0) Acne vulgaris  Comment: meds working well no concerns    Plan: drospirenone-ethinyl estradiol (KEVIN) 3-0.02 MG         per tablet               Lots going on for her - taking 21 credits, graduating a year early, getting  and then moving to [place].  Needs results of her pap sent to her new employer/insurer in [place].  She will mychart us the fax number    Today's PHQ-2 Score:   PHQ-2 ( 1999 Pfizer) 3/5/2018   Q1: Little interest or pleasure in doing things 0   Q2: Feeling down, depressed or hopeless 0   PHQ-2 Score 0   Q1: Little interest or pleasure in doing things Not at all   Q2: Feeling down, depressed or hopeless Not at all   PHQ-2 Score 0       Abuse: Current or Past(Physical, Sexual or Emotional)- No  Do you feel safe in your environment - Yes    Social History   Substance Use Topics     Smoking status: Never Smoker     Smokeless tobacco: Never Used     Alcohol use No     No flowsheet data found.No flowsheet data found.    Reviewed orders with patient.  Reviewed health maintenance and updated orders accordingly - Yes  Current  Outpatient Prescriptions   Medication Sig Dispense Refill     drospirenone-ethinyl estradiol (KEVIN) 3-0.02 MG per tablet Take 1 tablet by mouth daily 90 tablet 3     adapalene (DIFFERIN) 0.1 % gel Apply topically At Bedtime (Patient not taking: Reported on 3/7/2018) 45 g 3     [DISCONTINUED] drospirenone-ethinyl estradiol (KEVIN) 3-0.02 MG per tablet Take 1 tablet by mouth daily 90 tablet 3     psyllium 0.52 G capsule Take 1 capsule (0.52 g) by mouth daily After 1-2 weeks increase to 2 per day with water (Patient not taking: Reported on 3/7/2018) 90 capsule 1     Lactobacillus (FLORAJEN ACIDOPHILUS) CAPS 1 capsule twice daily (Patient not taking: Reported on 3/7/2018) 60 capsule 3     Lactobacillus-Inulin ([facility] DIGESTIVE [facility]) CAPS Try 1 capsule twice daily for 2-4 weeks (Patient not taking: Reported on 3/7/2018) 60 capsule 0     ALLERGY SHOTS          Mammogram not appropriate for this patient based on age.    Pertinent mammograms are reviewed under the imaging tab.  History of abnormal Pap smear: NO - age 21-29 PAP every 3 years recommended    Reviewed and updated as needed this visit by clinical staff  Tobacco  Allergies  Meds  Med Hx  Surg Hx  Fam Hx  Soc Hx        Reviewed and updated as needed this visit by Provider        Past Medical History:   Diagnosis Date     Allergic rhinitis due to animal dander      Allergy to mold spores     10/12 skin tests per Dr. Fernando pos. for: cat/dog/M/G/RW     Diagnostic skin and sensitization tests 10/12 skin tests per Dr. Fernando pos. for: cat/dog/M/G/RW     Need for desensitization to allergens 10/14 IT start for cat/dog/M/G/RW     Seasonal allergic conjunctivitis      Seasonal allergic rhinitis       History reviewed. No pertinent surgical history.    Review of Systems  C: NEGATIVE for fever, chills, change in weight  I: NEGATIVE for worrisome rashes, moles or lesions  E: NEGATIVE for vision changes or irritation  ENT: NEGATIVE for ear, mouth and throat  "problems  R: NEGATIVE for significant cough or SOB  B: NEGATIVE for masses, tenderness or discharge  CV: NEGATIVE for chest pain, palpitations or peripheral edema  GI: NEGATIVE for nausea, abdominal pain, heartburn, or change in bowel habits  : NEGATIVE for unusual urinary or vaginal symptoms. Periods are regular.  M: NEGATIVE for significant arthralgias or myalgia  N: NEGATIVE for weakness, dizziness or paresthesias  P: NEGATIVE for changes in mood or affect     OBJECTIVE:   /58  Pulse 88  Temp 99.3  F (37.4  C) (Tympanic)  Resp 14  Ht 5' 5.25\" (1.657 m)  Wt 143 lb (64.9 kg)  LMP 02/22/2018 (Approximate)  SpO2 99%  Breastfeeding? No  BMI 23.61 kg/m2  Physical Exam  GENERAL: healthy, alert and no distress  EYES: Eyes grossly normal to inspection, PERRL and conjunctivae and sclerae normal  HENT: ear canals and TM's normal, nose and mouth without ulcers or lesions  NECK: no adenopathy, no asymmetry, masses, or scars and thyroid normal to palpation  RESP: lungs clear to auscultation - no rales, rhonchi or wheezes  BREAST: normal without masses, tenderness or nipple discharge and no palpable axillary masses or adenopathy  CV: regular rate and rhythm, normal S1 S2, no S3 or S4, no murmur, click or rub, no peripheral edema and peripheral pulses strong  ABDOMEN: soft, nontender, no hepatosplenomegaly, no masses and bowel sounds normal   (female): normal female external genitalia, normal urethral meatus, vaginal mucosa pink, moist, well rugated, and normal cervix/adnexa/uterus without masses or discharge  MS: no gross musculoskeletal defects noted, no edema  SKIN: no suspicious lesions or rashes  NEURO: Normal strength and tone, mentation intact and speech normal  PSYCH: mentation appears normal, affect normal/bright    ASSESSMENT/PLAN:   1. Routine general medical examination at a health care facility  See AVS  - Pap imaged thin layer screen only - recommended age 21 - 24 years  - NEISSERIA GONORRHOEA " "PCR  - CHLAMYDIA TRACHOMATIS PCR    2. Acne vulgaris    - drospirenone-ethinyl estradiol (KEVIN) 3-0.02 MG per tablet; Take 1 tablet by mouth daily  Dispense: 90 tablet; Refill: 3    COUNSELING:  Reviewed preventive health counseling, as reflected in patient instructions       Regular exercise       Healthy diet/nutrition       Safe sex practices/STD prevention         reports that she has never smoked. She has never used smokeless tobacco.    Estimated body mass index is 23.12 kg/(m^2) as calculated from the following:    Height as of 7/10/17: 5' 5.25\" (1.657 m).    Weight as of 7/10/17: 140 lb (63.5 kg).       Counseling Resources:  ATP IV Guidelines  Pooled Cohorts Equation Calculator  Breast Cancer Risk Calculator  FRAX Risk Assessment  ICSI Preventive Guidelines  Dietary Guidelines for Americans, 2010  USDA's MyPlate  ASA Prophylaxis  Lung CA Screening    Carol Wilson MD  [facility]  Answers for HPI/ROS submitted by the patient on 3/5/2018   PHQ-2 Score: 0    "

## 2018-03-07 NOTE — MR AVS SNAPSHOT
After Visit Summary   3/7/2018    Dayana Whitman    MRN: [id number]           Patient Information     Date Of Birth          1995        Visit Information        Provider Department      3/7/2018 2:30 PM Carol Wilson MD [facility]        Today's Diagnoses     Routine general medical examination at a health care facility    -  1    Acne vulgaris          Care Instructions      Preventive Health Recommendations  Female Ages 18 to 25     Yearly exam:     See your health care provider every year in order to  o Review health changes.   o Discuss preventive care.    o Review your medicines if your doctor has prescribed any.      You should be tested each year for STDs (sexually transmitted diseases).       After age 20, talk to your provider about how often you should have cholesterol testing.      Starting at age 21, get a Pap test every three years. If you have an abnormal result, your doctor may have you test more often.      If you are at risk for diabetes, you should have a diabetes test (fasting glucose).     Shots:     Get a flu shot each year.     Get a tetanus shot every 10 years.     Consider getting the shot (vaccine) that prevents cervical cancer (Gardasil).    Nutrition:     Eat at least 5 servings of fruits and vegetables each day.    Eat whole-grain bread, whole-wheat pasta and brown rice instead of white grains and rice.    Talk to your provider about Calcium and Vitamin D.     Lifestyle    Exercise at least 150 minutes a week each week (30 minutes a day, 5 days a week). This will help you control your weight and prevent disease.    Limit alcohol to one drink per day.    No smoking.     Wear sunscreen to prevent skin cancer.    See your dentist every six months for an exam and cleaning.          Follow-ups after your visit        Who to contact     If you have questions or need follow up information about today's clinic visit or your schedule please contact  "[facility] directly at [phone].  Normal or non-critical lab and imaging results will be communicated to you by MyChart, letter or phone within 4 business days after the clinic has received the results. If you do not hear from us within 7 days, please contact the clinic through [employer] or phone. If you have a critical or abnormal lab result, we will notify you by phone as soon as possible.  Submit refill requests through [employer] or call your pharmacy and they will forward the refill request to us. Please allow 3 business days for your refill to be completed.          Additional Information About Your Visit        [employer] Information     [employer] gives you secure access to your electronic health record. If you see a primary care provider, you can also send messages to your care team and make appointments. If you have questions, please call your primary care clinic.  If you do not have a primary care provider, please call [phone] and they will assist you.        Care EveryWhere ID     This is your Care EveryWhere ID. This could be used by other organizations to access your [facility] medical records  [id number]        Your Vitals Were     Pulse Temperature Respirations Height Last Period Pulse Oximetry    88 99.3  F (37.4  C) (Tympanic) 14 5' 5.25\" (1.657 m) 02/22/2018 (Approximate) 99%    Breastfeeding? BMI (Body Mass Index)                No 23.61 kg/m2           Blood Pressure from Last 3 Encounters:   03/07/18 110/58   07/10/17 124/60   01/27/17 114/82    Weight from Last 3 Encounters:   03/07/18 143 lb (64.9 kg)   07/10/17 140 lb (63.5 kg)   01/27/17 144 lb 3 oz (65.4 kg) (74 %)*     * Growth percentiles are based on CDC 2-20 Years data.              We Performed the Following     CHLAMYDIA TRACHOMATIS PCR     NEISSERIA GONORRHOEA PCR     Pap imaged thin layer screen only - recommended age 21 - 24 years          Where to get your medicines      These medications were sent to [facility] " [id number] - [place], [place] - [address]  [address], [facility] [address]     Phone:  [phone]     drospirenone-ethinyl estradiol 3-0.02 MG per tablet          Primary Care Provider Office Phone # Fax #    KittanningLaura Wilson [phone]       [address]  [facility] [address]        Equal Access to Services     KAL YODER : Hadii aad ku hadasho Soomaali, waaxda luqadaha, qaybta kaalmada adeegyada, waxay idiin hayaan adeeg kharacyndi la'joann . So [facility] [phone].    ATENCIÓN: Si habla español, tiene a elias disposición servicios gratuitos de asistencia lingüística. [facility] [phone].    We comply with applicable federal civil rights laws and [place] laws. We do not discriminate on the basis of race, color, national origin, age, disability, sex, sexual orientation, or gender identity.            Thank you!     Thank you for choosing [facility]  for your care. Our goal is always to provide you with excellent care. Hearing back from our patients is one way we can continue to improve our services. Please take a few minutes to complete the written survey that you may receive in the mail after your visit with us. Thank you!             Your Updated Medication List - Protect others around you: Learn how to safely use, store and throw away your medicines at www.disposemymeds.org.          This list is accurate as of 3/7/18  2:55 PM.  Always use your most recent med list.                   Brand Name Dispense Instructions for use Diagnosis    adapalene 0.1 % gel    DIFFERIN    45 g    Apply topically At Bedtime    Acne vulgaris       ALLERGEN IMMUNOTHERAPY PRESCRIPTION           CULTURELLE DIGESTIVE HEALTH Caps     60 capsule    Try 1 capsule twice daily for 2-4 weeks    Loose stools       drospirenone-ethinyl estradiol 3-0.02 MG per tablet    KEVIN    90 tablet    Take 1 tablet by mouth daily    Acne vulgaris       FLORAJEN ACIDOPHILUS Caps     60  capsule    1 capsule twice daily    Irritable bowel syndrome with diarrhea       psyllium 0.52 G capsule     90 capsule    Take 1 capsule (0.52 g) by mouth daily After 1-2 weeks increase to 2 per day with water    Irritable bowel syndrome with diarrhea

## 2018-03-08 LAB
C TRACH DNA SPEC QL NAA+PROBE: NEGATIVE
N GONORRHOEA DNA SPEC QL NAA+PROBE: NEGATIVE
SPECIMEN SOURCE: NORMAL
SPECIMEN SOURCE: NORMAL

## 2018-03-09 LAB
COPATH REPORT: NORMAL
PAP: NORMAL

## 2018-03-14 ENCOUNTER — MYC MEDICAL ADVICE (OUTPATIENT)
Dept: FAMILY MEDICINE | Facility: CLINIC | Age: 23
End: 2018-03-14

## 2018-03-15 ENCOUNTER — TELEPHONE (OUTPATIENT)
Dept: FAMILY MEDICINE | Facility: CLINIC | Age: 23
End: 2018-03-15

## 2018-03-15 NOTE — TELEPHONE ENCOUNTER
Pt called leaving a voice mail message on my phone requesting her recent pap results from 03/07/18 to be faxed. Her phone number is [phone]. In reviewing her chart she also contacted the clinic via [employer] and they requested that she contact the medical records dept to get this request completed. I sent her a [employer] message this am inquiring if she was able to contact them. Will await her response.   Subha Jorgensen RN-Pap Tracking

## 2020-02-23 ENCOUNTER — HEALTH MAINTENANCE LETTER (OUTPATIENT)
Age: 25
End: 2020-02-23

## 2020-12-12 ENCOUNTER — HEALTH MAINTENANCE LETTER (OUTPATIENT)
Age: 25
End: 2020-12-12

## 2021-04-11 ENCOUNTER — HEALTH MAINTENANCE LETTER (OUTPATIENT)
Age: 26
End: 2021-04-11

## 2021-09-26 ENCOUNTER — HEALTH MAINTENANCE LETTER (OUTPATIENT)
Age: 26
End: 2021-09-26

## 2022-05-08 ENCOUNTER — HEALTH MAINTENANCE LETTER (OUTPATIENT)
Age: 27
End: 2022-05-08

## 2023-01-08 ENCOUNTER — HEALTH MAINTENANCE LETTER (OUTPATIENT)
Age: 28
End: 2023-01-08

## 2023-06-02 ENCOUNTER — HEALTH MAINTENANCE LETTER (OUTPATIENT)
Age: 28
End: 2023-06-02